# Patient Record
(demographics unavailable — no encounter records)

---

## 2024-10-15 NOTE — ASSESSMENT
[FreeTextEntry1] : Ms. SHRUTI MERRITT is a 42-year-old woman, referred by Dr. Jennifer Mejias for evaluation of R breast intraductal papilloma, s/p R breast excisional biopsy 3/22/2024, here for a follow up visit.  3/22/2024 R breast excisional biopsy with magseed localization -benign including fibroadenomatoid changes, PASH and microcalcifications  Recent breast ultrasound stable probable benign findings, BR3  Exam today with stable left breast mass, no other dominant masses, no lymphadenopathy  PLAN: -B/L SM 10/2024 -B/L US 4/2025 - RTO 6 months

## 2024-10-15 NOTE — PHYSICAL EXAM
[Normocephalic] : normocephalic [Atraumatic] : atraumatic [EOMI] : extra ocular movement intact [PERRL] : pupils equal, round and reactive to light [Sclera nonicteric] : sclera nonicteric [Supple] : supple [No Supraclavicular Adenopathy] : no supraclavicular adenopathy [Examined in the supine and seated position] : examined in the supine and seated position [Asymmetrical] : asymmetrical [Bra Size: ___] : Bra Size: [unfilled] [None] : no ptosis [No dominant masses] : no dominant masses in right breast  [No Nipple Retraction] : no left nipple retraction [No Nipple Discharge] : no left nipple discharge [Breast Mass Right Breast ___cm] : no masses [Breast Nipple Inversion] : nipples not inverted [Breast Nipple Retraction] : nipples not retracted [Breast Nipple Flattening] : nipples not flattened [Breast Nipple Fissures] : nipples not fissured [Breast Abnormal Lactation (Galactorrhea)] : no galactorrhea [Breast Abnormal Secretion Bloody Fluid] : no bloody discharge [Breast Abnormal Secretion Serous Fluid] : no serous discharge [Breast Abnormal Secretion Opalescent Fluid] : no milky discharge [No Axillary Lymphadenopathy] : no left axillary lymphadenopathy [No Edema] : no edema [No Rashes] : no rashes [No Ulceration] : no ulceration [de-identified] : non-labored respirations  [de-identified] : L>R [de-identified] : well-healed scar [de-identified] : 1 cm mass at 2:00 axis-- mobile, circumscribed

## 2024-10-15 NOTE — CONSULT LETTER
[Dear  ___] : Dear  [unfilled], [Consult Letter:] : I had the pleasure of evaluating your patient, [unfilled]. [Please see my note below.] : Please see my note below. [Consult Closing:] : Thank you very much for allowing me to participate in the care of this patient.  If you have any questions, please do not hesitate to contact me. [Sincerely,] : Sincerely, [Courtesy Letter:] : I had the pleasure of seeing your patient, [unfilled], in my office today. [FreeTextEntry2] : JEREL GILL MD [address] [facility] [place], [address] [place] [id number] [FreeTextEntry3] : Melva Montgomery MD Breast Surgeon Division of Surgical Oncology Department of Surgery [address] [place] [address] Tel: [phone] Fax: [phone] Email: [email or web address]

## 2024-10-15 NOTE — HISTORY OF PRESENT ILLNESS
[FreeTextEntry1] : Ms. SHRUTI MERRITT is a 42-year-old woman, referred by Dr. Jennifer Mejias for evaluation of R breast intraductal papilloma, s/p 3/22/2024 R breast excisional biopsy, here for a follow up visit.  She is not accompanied by her  Stuart.  Prior history: The patient reports that she underwent a baseline mammogram in 10/2023. She does not perform self-exams at home. She did not have any breast complaints prior to her imaging. She notes some soreness and lumpiness in her right breast after the biopsy.  Recent imaging: 10/23/2023 B/L SM (R) revealed heterogeneously dense breasts -R mid central medial N3 grouped microcalcifications--> r/u R DM -B/L multiple benign-appearing masses -BR0  2023 R DM (R) revealed heterogeneously dense breasts -R 12:00 small cluster of minimally pleomorphic calcifications--> f/u R stereotactic biopsy -BR4  2024 R stereotactic biopsy (R) -R calcifications (top hat): intraductal papilloma with calcifications *concordant and high risk--> f/u breast surgery consultation  PMH: Denies PSH:  Denies Meds: Denies ALL:  Denies SH:  No tobacco. Occasional EtOH FH: No breast cancer or other cancers GYN: Menarche unsure. LMP 24, regular. . OCP none. Fertility none. HRT .none  2024 B/L U/S ([facility]) - benign appearing simple & minimally complicated cysts to B/L breasts - R 12:00 N1, 1.3 cm oval hypoechoic mass, probably benign - R 8:00 N3, 1.1 cm oval hypoechoic mas, probably benign - R 8:00 N5, 0.9 cm oval hypoechoic mass, probably benign - R 9:00 N4, 0.5 cm oval hypoechoic mass, probably benign -> f/u R U/S in 6 months - L 12:00 N1, 1.5 cm hypoechoic mass, likely stable compared to 2012 U/S allowing for differences in position & technique - L 1:00 N6, 1.9 cm oval hypoechoic mass, probably benign - L 2:00 N7, corresponding to palpable concern, a 1.7 cm oval hypoechoic mass w/ micro lobulated margins, indeterminate -> f/u L DM & L USG-CNB - L 4:00 N1, 1.8 cm oval hypoechoic mass, probably benign - L 9:00 N5, 1.5 cm oval hypoechoic mass, probably benign -> f/u L U/S in 6 months - BR4   2024 L DM ([facility]) - multiple simple-appearing nodular lesions seen throughout breast without obvious change, including area of palpable concern for which bx is recommended - BR4A   2024 L USG-CNB ([facility]) - L 2:00 N7 (ribbon): fibroadenoma, concordant, f/u U/S in 6 months  3/22/2024 R breast excisional biopsy with MagSeed localization -Benign including fibroadenomatoid changes, PASH and microcalcifications  4/3/2024 (postop): Pt took Tylenol for the first 3-4 days. Otherwise doing well, notes itching, no pain. No fevers/chills.  10/7/2024 B/L US (NW) -R 12:00 N1 scar site, previous nodule no longer seen -R 1:00 N4 1 cm cyst -R 8:00 N5 8 mm hypoechoic nodule, stable--> f/u R US in 6 months -R 8:00 N3 9 mm hypoechoic nodule, stable--> f/u R US in 6 months -R 9:00 N4 4 mm hypoechoic nodule, stable to slightly smaller--> f/u R US in 6 months -R UIQ, LOQ, UOQ sub-cm cysts -L 12:00 N4 1.6 cm cyst -L 12:00 N1 1.5 cm hypoechoic nodule, unchanged--> f/u L US in 6 months -L 12:00 N6 1.6 cm hypoechoic nodule, unchanged--> f/u L US in 6 months -L 2:00 N7 1.6 cm previously biopsied nodule, stable--> f/u L US in 6 months -L 4:00 N1 1.8 cm hypoechoic nodule, stable -L 4:00 RA 1.2 cm newly seen hypoechoic nodule--> f/u L US in 6 months -L 9:00 N5 1.5 cm hypoechoic nodule, stable -L UOQ sub-cm cyst -BR3   Interval history:  Patient denies any breast complaints today. Denies any breast masses, skin changes, or nipple discharge. Notes occasional itching, possibly correlating to cycles.

## 2024-10-15 NOTE — PAST MEDICAL HISTORY
[Menstruating] : The patient is menstruating [Menarche Age ____] : age at menarche was [unfilled] [Definite ___ (Date)] : the last menstrual period was [unfilled] [Regular Cycle Intervals] : have been regular [Total Preg ___] : G[unfilled] [History of Hormone Replacement Treatment] : has no history of hormone replacement treatment [FreeTextEntry6] : none [FreeTextEntry7] : none [FreeTextEntry8] : n/a

## 2024-10-15 NOTE — CONSULT LETTER
[Dear  ___] : Dear  [unfilled], [Consult Letter:] : I had the pleasure of evaluating your patient, [unfilled]. [Please see my note below.] : Please see my note below. [Consult Closing:] : Thank you very much for allowing me to participate in the care of this patient.  If you have any questions, please do not hesitate to contact me. [Sincerely,] : Sincerely, [Courtesy Letter:] : I had the pleasure of seeing your patient, [unfilled], in my office today. [FreeTextEntry2] : JEREL GILL MD [address] [facility] [place], [address] [place] [id number] [FreeTextEntry3] : Melva Montgomery MD Breast Surgeon Division of Surgical Oncology Department of Surgery [address] [place] [address] Tel: [phone] Fax: [phone] Email: [email or web address]

## 2024-10-15 NOTE — PHYSICAL EXAM
[Normocephalic] : normocephalic [Atraumatic] : atraumatic [EOMI] : extra ocular movement intact [PERRL] : pupils equal, round and reactive to light [Sclera nonicteric] : sclera nonicteric [Supple] : supple [No Supraclavicular Adenopathy] : no supraclavicular adenopathy [Examined in the supine and seated position] : examined in the supine and seated position [Asymmetrical] : asymmetrical [Bra Size: ___] : Bra Size: [unfilled] [None] : no ptosis [No dominant masses] : no dominant masses in right breast  [No Nipple Retraction] : no left nipple retraction [No Nipple Discharge] : no left nipple discharge [Breast Mass Right Breast ___cm] : no masses [Breast Nipple Inversion] : nipples not inverted [Breast Nipple Retraction] : nipples not retracted [Breast Nipple Flattening] : nipples not flattened [Breast Nipple Fissures] : nipples not fissured [Breast Abnormal Lactation (Galactorrhea)] : no galactorrhea [Breast Abnormal Secretion Bloody Fluid] : no bloody discharge [Breast Abnormal Secretion Serous Fluid] : no serous discharge [Breast Abnormal Secretion Opalescent Fluid] : no milky discharge [No Axillary Lymphadenopathy] : no left axillary lymphadenopathy [No Edema] : no edema [No Rashes] : no rashes [No Ulceration] : no ulceration [de-identified] : non-labored respirations  [de-identified] : L>R [de-identified] : well-healed scar [de-identified] : 1 cm mass at 2:00 axis-- mobile, circumscribed

## 2025-04-21 NOTE — ASSESSMENT
[FreeTextEntry1] : Ms. SHRUTI MERRITT is a 42-year-old woman, referred by Dr. Jennifer Mejias for evaluation of R breast intraductal papilloma, s/p R breast excisional biopsy 3/22/2024, here for a follow up visit.  3/22/2024 R breast excisional biopsy with magseed localization -benign including fibroadenomatoid changes, PASH and microcalcifications  Recent imaging stable probable benign findings, BR3  Exam today with stable left breast mass, no other dominant masses, no lymphadenopathy  PLAN: -B/L SM/US 10/2025 - RTO 6 months

## 2025-04-21 NOTE — CONSULT LETTER
[Dear  ___] : Dear  [unfilled], [Courtesy Letter:] : I had the pleasure of seeing your patient, [unfilled], in my office today. [Please see my note below.] : Please see my note below. [Consult Closing:] : Thank you very much for allowing me to participate in the care of this patient.  If you have any questions, please do not hesitate to contact me. [Sincerely,] : Sincerely, [FreeTextEntry2] : JEREL GILL MD [address] [facility] [place], [address] [place] [id number] [FreeTextEntry3] : Melva Montgomery MD Breast Surgeon Division of Surgical Oncology Department of Surgery [address] [place] [address] Tel: [phone] Fax: [phone] Email: [email or web address]

## 2025-04-21 NOTE — PAST MEDICAL HISTORY
[Menstruating] : The patient is menstruating [Menarche Age ____] : age at menarche was [unfilled] [History of Hormone Replacement Treatment] : has no history of hormone replacement treatment [Definite ___ (Date)] : the last menstrual period was [unfilled] [Regular Cycle Intervals] : have been regular [Total Preg ___] : G[unfilled] [FreeTextEntry6] : none [FreeTextEntry7] : none [FreeTextEntry8] : n/a

## 2025-04-21 NOTE — HISTORY OF PRESENT ILLNESS
[FreeTextEntry1] : Ms. SHRUTI MERRITT is a 42-year-old woman, referred by Dr. Jennifer Mejias for evaluation of R breast intraductal papilloma, s/p 3/22/2024 R breast excisional biopsy, here for a follow up visit.  She is not accompanied by her  Stuart.  Prior history: The patient reports that she underwent a baseline mammogram in 10/2023. She does not perform self-exams at home. She did not have any breast complaints prior to her imaging. She notes some soreness and lumpiness in her right breast after the biopsy.  Recent imaging: 10/23/2023 B/L SM (R) revealed heterogeneously dense breasts -R mid central medial N3 grouped microcalcifications--> r/u R DM -B/L multiple benign-appearing masses -BR0  2023 R DM (R) revealed heterogeneously dense breasts -R 12:00 small cluster of minimally pleomorphic calcifications--> f/u R stereotactic biopsy -BR4  2024 R stereotactic biopsy (R) -R calcifications (top hat): intraductal papilloma with calcifications *concordant and high risk--> f/u breast surgery consultation  PMH: Denies PSH:  Denies Meds: Denies ALL:  Denies SH:  No tobacco. Occasional EtOH FH: No breast cancer or other cancers GYN: Menarche unsure. LMP 24, regular. . OCP none. Fertility none. HRT .none  2024 B/L U/S ([facility]) - benign appearing simple & minimally complicated cysts to B/L breasts - R 12:00 N1, 1.3 cm oval hypoechoic mass, probably benign - R 8:00 N3, 1.1 cm oval hypoechoic mas, probably benign - R 8:00 N5, 0.9 cm oval hypoechoic mass, probably benign - R 9:00 N4, 0.5 cm oval hypoechoic mass, probably benign -> f/u R U/S in 6 months - L 12:00 N1, 1.5 cm hypoechoic mass, likely stable compared to 2012 U/S allowing for differences in position & technique - L 1:00 N6, 1.9 cm oval hypoechoic mass, probably benign - L 2:00 N7, corresponding to palpable concern, a 1.7 cm oval hypoechoic mass w/ micro lobulated margins, indeterminate -> f/u L DM & L USG-CNB - L 4:00 N1, 1.8 cm oval hypoechoic mass, probably benign - L 9:00 N5, 1.5 cm oval hypoechoic mass, probably benign -> f/u L U/S in 6 months - BR4   2024 L DM ([facility]) - multiple simple-appearing nodular lesions seen throughout breast without obvious change, including area of palpable concern for which bx is recommended - BR4A   2024 L USG-CNB ([facility]) - L 2:00 N7 (ribbon): fibroadenoma, concordant, f/u U/S in 6 months  3/22/2024 R breast excisional biopsy with MagSeed localization -Benign including fibroadenomatoid changes, PASH and microcalcifications  4/3/2024 (postop): Pt took Tylenol for the first 3-4 days. Otherwise doing well, notes itching, no pain. No fevers/chills.  10/7/2024 B/L US (NW) -R 12:00 N1 scar site, previous nodule no longer seen -R 1:00 N4 1 cm cyst -R 8:00 N5 8 mm hypoechoic nodule, stable--> f/u R US in 6 months -R 8:00 N3 9 mm hypoechoic nodule, stable--> f/u R US in 6 months -R 9:00 N4 4 mm hypoechoic nodule, stable to slightly smaller--> f/u R US in 6 months -R UIQ, LOQ, UOQ sub-cm cysts -L 12:00 N4 1.6 cm cyst -L 12:00 N1 1.5 cm hypoechoic nodule, unchanged--> f/u L US in 6 months -L 12:00 N6 1.6 cm hypoechoic nodule, unchanged--> f/u L US in 6 months -L 2:00 N7 1.6 cm previously biopsied nodule, stable--> f/u L US in 6 months -L 4:00 N1 1.8 cm hypoechoic nodule, stable -L 4:00 RA 1.2 cm newly seen hypoechoic nodule--> f/u L US in 6 months -L 9:00 N5 1.5 cm hypoechoic nodule, stable -L UOQ sub-cm cyst -BR3   10/15/2024:  Patient denies any breast complaints today. Denies any breast masses, skin changes, or nipple discharge. Notes occasional itching, possibly correlating to cycles.   2025 B/L SM (NW) 11.3% revealed heterogeneously dense breasts -R neg -L multiple masses one w/ associated clip lateral superior -BR3  2025 B/L US (NW) -R 8:00 N5 8 mm hypoechoic mass, stable--> f/u R US in 6 mo -R 8:00 N3 9 mm hypoechoic mass, stable--> f/u R US in 6 mo -R 9:00 N4 4 mm hypoechoic mass, stable--> f/u R US in 6 mo -L 12:00 N4 1 cm complicated cyst -L 12:00 N1 1.6 cm hypoechoic mass, stable--> f/u L US in 6 mo -L 1:00 N6 1.8 cm hypoechoic mass, stable--> f/u L US in 6 mo -L 2:00 N7 1.6 cm hypoechoic mass, stable -L 4:00 N1 1.9 cm hypoechoic mass, stable--> f/u L US in 6 mo -L 4:00 RA 1 cm hypoechoic mass, stable--> f/u L US in 7 mo -L 9:00 N5 1.7 cm hypoechoic mass, stable--> f/u L US in 6 mo -BR3  Interval history:

## 2025-04-29 NOTE — HISTORY OF PRESENT ILLNESS
[FreeTextEntry1] : Ms. SHRUTI MERRITT is a 42-year-old woman, referred by Dr. Jennifer Mejias for evaluation of R breast intraductal papilloma, s/p 3/22/2024 R breast excisional biopsy, here for a follow up visit.  She is not accompanied by her  Stuart.  Prior history: The patient reports that she underwent a baseline mammogram in 10/2023. She does not perform self-exams at home. She did not have any breast complaints prior to her imaging. She notes some soreness and lumpiness in her right breast after the biopsy.  Recent imaging: 10/23/2023 B/L SM (R) revealed heterogeneously dense breasts -R mid central medial N3 grouped microcalcifications--> r/u R DM -B/L multiple benign-appearing masses -BR0  2023 R DM (R) revealed heterogeneously dense breasts -R 12:00 small cluster of minimally pleomorphic calcifications--> f/u R stereotactic biopsy -BR4  2024 R stereotactic biopsy (R) -R calcifications (top hat): intraductal papilloma with calcifications *concordant and high risk--> f/u breast surgery consultation  PMH: Denies PSH:  Denies Meds: Denies ALL:  Denies SH:  No tobacco. Occasional EtOH FH: No breast cancer or other cancers GYN: Menarche unsure. LMP 24, regular. . OCP none. Fertility none. HRT .none  2024 B/L U/S ([facility]) - benign appearing simple & minimally complicated cysts to B/L breasts - R 12:00 N1, 1.3 cm oval hypoechoic mass, probably benign - R 8:00 N3, 1.1 cm oval hypoechoic mas, probably benign - R 8:00 N5, 0.9 cm oval hypoechoic mass, probably benign - R 9:00 N4, 0.5 cm oval hypoechoic mass, probably benign -> f/u R U/S in 6 months - L 12:00 N1, 1.5 cm hypoechoic mass, likely stable compared to 2012 U/S allowing for differences in position & technique - L 1:00 N6, 1.9 cm oval hypoechoic mass, probably benign - L 2:00 N7, corresponding to palpable concern, a 1.7 cm oval hypoechoic mass w/ micro lobulated margins, indeterminate -> f/u L DM & L USG-CNB - L 4:00 N1, 1.8 cm oval hypoechoic mass, probably benign - L 9:00 N5, 1.5 cm oval hypoechoic mass, probably benign -> f/u L U/S in 6 months - BR4   2024 L DM ([facility]) - multiple simple-appearing nodular lesions seen throughout breast without obvious change, including area of palpable concern for which bx is recommended - BR4A   2024 L USG-CNB ([facility]) - L 2:00 N7 (ribbon): fibroadenoma, concordant, f/u U/S in 6 months  3/22/2024 R breast excisional biopsy with MagSeed localization -Benign including fibroadenomatoid changes, PASH and microcalcifications  4/3/2024 (postop): Pt took Tylenol for the first 3-4 days. Otherwise doing well, notes itching, no pain. No fevers/chills.  10/7/2024 B/L US (NW) -R 12:00 N1 scar site, previous nodule no longer seen -R 1:00 N4 1 cm cyst -R 8:00 N5 8 mm hypoechoic nodule, stable--> f/u R US in 6 months -R 8:00 N3 9 mm hypoechoic nodule, stable--> f/u R US in 6 months -R 9:00 N4 4 mm hypoechoic nodule, stable to slightly smaller--> f/u R US in 6 months -R UIQ, LOQ, UOQ sub-cm cysts -L 12:00 N4 1.6 cm cyst -L 12:00 N1 1.5 cm hypoechoic nodule, unchanged--> f/u L US in 6 months -L 12:00 N6 1.6 cm hypoechoic nodule, unchanged--> f/u L US in 6 months -L 2:00 N7 1.6 cm previously biopsied nodule, stable--> f/u L US in 6 months -L 4:00 N1 1.8 cm hypoechoic nodule, stable -L 4:00 RA 1.2 cm newly seen hypoechoic nodule--> f/u L US in 6 months -L 9:00 N5 1.5 cm hypoechoic nodule, stable -L UOQ sub-cm cyst -BR3   10/15/2024:  Patient denies any breast complaints today. Denies any breast masses, skin changes, or nipple discharge. Notes occasional itching, possibly correlating to cycles.   2025 B/L SM (NW) 11.3% revealed heterogeneously dense breasts -R neg -L multiple masses one w/ associated clip lateral superior -BR3  2025 B/L US (NW) -R 8:00 N5 8 mm hypoechoic mass, stable--> f/u R US in 6 mo -R 8:00 N3 9 mm hypoechoic mass, stable--> f/u R US in 6 mo -R 9:00 N4 4 mm hypoechoic mass, stable--> f/u R US in 6 mo -L 12:00 N4 1 cm complicated cyst -L 12:00 N1 1.6 cm hypoechoic mass, stable--> f/u L US in 6 mo -L 1:00 N6 1.8 cm hypoechoic mass, stable--> f/u L US in 6 mo -L 2:00 N7 1.6 cm hypoechoic mass, stable -L 4:00 N1 1.9 cm hypoechoic mass, stable--> f/u L US in 6 mo -L 4:00 RA 1 cm hypoechoic mass, stable--> f/u L US in 6 mo -L 9:00 N5 1.7 cm hypoechoic mass, stable--> f/u L US in 6 mo -BR3  Interval history:  Patient denies any breast complaints today. Denies any breast masses, skin changes, or nipple discharge.

## 2025-04-29 NOTE — ASSESSMENT
[FreeTextEntry1] : Ms. SHRUTI MERRITT is a 42-year-old woman, referred by Dr. Jennifer Mejias for evaluation of R breast intraductal papilloma, s/p R breast excisional biopsy 3/22/2024, here for a follow up visit.  3/22/2024 R breast excisional biopsy with magseed localization -benign including fibroadenomatoid changes, PASH and microcalcifications  Recent imaging stable probable benign findings, BR3  Exam today with stable left breast mass, no other dominant masses, no lymphadenopathy  PLAN: -B/L US 10/2025 - RTO 6 months

## 2025-04-29 NOTE — CONSULT LETTER
[FreeTextEntry2] : JEREL GILL [facility] [address] [facility] [place], [address] [place] [id number] [FreeTextEntry3] : Melva Montgomery MD Breast Surgeon Division of Surgical Oncology Department of Surgery [address] [place] [address] Tel: [phone] Fax: [phone] Email: [email or web address]

## 2025-04-29 NOTE — PHYSICAL EXAM
[Normocephalic] : normocephalic [Atraumatic] : atraumatic [EOMI] : extra ocular movement intact [PERRL] : pupils equal, round and reactive to light [Sclera nonicteric] : sclera nonicteric [Supple] : supple [No Supraclavicular Adenopathy] : no supraclavicular adenopathy [Examined in the supine and seated position] : examined in the supine and seated position [Asymmetrical] : asymmetrical [Bra Size: ___] : Bra Size: [unfilled] [None] : no ptosis [No dominant masses] : no dominant masses in right breast  [No Nipple Retraction] : no left nipple retraction [No Nipple Discharge] : no left nipple discharge [Breast Mass Right Breast ___cm] : no masses [Breast Nipple Inversion] : nipples not inverted [Breast Nipple Retraction] : nipples not retracted [Breast Nipple Flattening] : nipples not flattened [Breast Nipple Fissures] : nipples not fissured [Breast Abnormal Lactation (Galactorrhea)] : no galactorrhea [Breast Abnormal Secretion Bloody Fluid] : no bloody discharge [Breast Abnormal Secretion Serous Fluid] : no serous discharge [Breast Abnormal Secretion Opalescent Fluid] : no milky discharge [No Axillary Lymphadenopathy] : no left axillary lymphadenopathy [No Edema] : no edema [No Rashes] : no rashes [No Ulceration] : no ulceration [de-identified] : non-labored respirations  [de-identified] : L>R [de-identified] : well-healed scar [de-identified] : 1 cm mass at 2:00 axis-- mobile, circumscribed, stable

## 2025-04-29 NOTE — CONSULT LETTER
[FreeTextEntry2] : JER[facility] GILL [facility] [address] [facility] [place], [address] [place] [id number] [FreeTextEntry3] : Melva Montgomery MD Breast Surgeon Division of Surgical Oncology Department of Surgery [address] [place] [address] Tel: [phone] Fax: [phone] Email: [email or web address]

## 2025-04-29 NOTE — PHYSICAL EXAM
[Normocephalic] : normocephalic [Atraumatic] : atraumatic [EOMI] : extra ocular movement intact [PERRL] : pupils equal, round and reactive to light [Sclera nonicteric] : sclera nonicteric [Supple] : supple [No Supraclavicular Adenopathy] : no supraclavicular adenopathy [Examined in the supine and seated position] : examined in the supine and seated position [Asymmetrical] : asymmetrical [Bra Size: ___] : Bra Size: [unfilled] [None] : no ptosis [No dominant masses] : no dominant masses in right breast  [No Nipple Retraction] : no left nipple retraction [No Nipple Discharge] : no left nipple discharge [Breast Mass Right Breast ___cm] : no masses [Breast Nipple Inversion] : nipples not inverted [Breast Nipple Retraction] : nipples not retracted [Breast Nipple Flattening] : nipples not flattened [Breast Nipple Fissures] : nipples not fissured [Breast Abnormal Lactation (Galactorrhea)] : no galactorrhea [Breast Abnormal Secretion Bloody Fluid] : no bloody discharge [Breast Abnormal Secretion Serous Fluid] : no serous discharge [Breast Abnormal Secretion Opalescent Fluid] : no milky discharge [No Axillary Lymphadenopathy] : no left axillary lymphadenopathy [No Edema] : no edema [No Rashes] : no rashes [No Ulceration] : no ulceration [de-identified] : non-labored respirations  [de-identified] : L>R [de-identified] : well-healed scar [de-identified] : 1 cm mass at 2:00 axis-- mobile, circumscribed, stable

## 2025-04-29 NOTE — PAST MEDICAL HISTORY
[History of Hormone Replacement Treatment] : has no history of hormone replacement treatment [FreeTextEntry6] : none [FreeTextEntry7] : none [FreeTextEntry8] : n/a